# Patient Record
Sex: FEMALE | Race: WHITE | NOT HISPANIC OR LATINO
[De-identification: names, ages, dates, MRNs, and addresses within clinical notes are randomized per-mention and may not be internally consistent; named-entity substitution may affect disease eponyms.]

---

## 2019-07-10 PROBLEM — Z00.00 ENCOUNTER FOR PREVENTIVE HEALTH EXAMINATION: Status: ACTIVE | Noted: 2019-07-10

## 2019-08-09 ENCOUNTER — APPOINTMENT (OUTPATIENT)
Dept: BARIATRICS | Facility: CLINIC | Age: 59
End: 2019-08-09

## 2019-09-20 ENCOUNTER — APPOINTMENT (OUTPATIENT)
Dept: BARIATRICS | Facility: CLINIC | Age: 59
End: 2019-09-20

## 2019-10-31 ENCOUNTER — APPOINTMENT (OUTPATIENT)
Dept: BARIATRICS | Facility: CLINIC | Age: 59
End: 2019-10-31
Payer: COMMERCIAL

## 2019-10-31 VITALS
DIASTOLIC BLOOD PRESSURE: 83 MMHG | HEART RATE: 71 BPM | SYSTOLIC BLOOD PRESSURE: 144 MMHG | HEIGHT: 62.5 IN | BODY MASS INDEX: 36.15 KG/M2 | WEIGHT: 201.5 LBS

## 2019-10-31 DIAGNOSIS — Z86.79 PERSONAL HISTORY OF OTHER DISEASES OF THE CIRCULATORY SYSTEM: ICD-10-CM

## 2019-10-31 DIAGNOSIS — K76.0 FATTY (CHANGE OF) LIVER, NOT ELSEWHERE CLASSIFIED: ICD-10-CM

## 2019-10-31 DIAGNOSIS — Z83.3 FAMILY HISTORY OF DIABETES MELLITUS: ICD-10-CM

## 2019-10-31 DIAGNOSIS — Z86.59 PERSONAL HISTORY OF OTHER MENTAL AND BEHAVIORAL DISORDERS: ICD-10-CM

## 2019-10-31 DIAGNOSIS — Z78.9 OTHER SPECIFIED HEALTH STATUS: ICD-10-CM

## 2019-10-31 DIAGNOSIS — Z86.73 PERSONAL HISTORY OF TRANSIENT ISCHEMIC ATTACK (TIA), AND CEREBRAL INFARCTION W/OUT RESIDUAL DEFICITS: ICD-10-CM

## 2019-10-31 DIAGNOSIS — G47.33 OBSTRUCTIVE SLEEP APNEA (ADULT) (PEDIATRIC): ICD-10-CM

## 2019-10-31 PROCEDURE — 99205 OFFICE O/P NEW HI 60 MIN: CPT

## 2019-10-31 RX ORDER — CARVEDILOL 12.5 MG/1
12.5 TABLET, FILM COATED ORAL
Refills: 0 | Status: ACTIVE | COMMUNITY

## 2019-10-31 RX ORDER — IRBESARTAN 150 MG/1
150 TABLET ORAL
Refills: 0 | Status: ACTIVE | COMMUNITY

## 2019-10-31 RX ORDER — DEXAMETHASONE 1 MG/1
1 TABLET ORAL
Qty: 1 | Refills: 0 | Status: ACTIVE | COMMUNITY
Start: 2019-10-31 | End: 1900-01-01

## 2019-10-31 RX ORDER — DAPAGLIFLOZIN 10 MG/1
10 TABLET, FILM COATED ORAL
Refills: 0 | Status: ACTIVE | COMMUNITY

## 2019-10-31 RX ORDER — INSULIN DEGLUDEC INJECTION 100 U/ML
100 INJECTION, SOLUTION SUBCUTANEOUS
Refills: 0 | Status: ACTIVE | COMMUNITY

## 2019-10-31 RX ORDER — PAROXETINE HYDROCHLORIDE 30 MG/1
30 TABLET, FILM COATED ORAL
Refills: 0 | Status: ACTIVE | COMMUNITY

## 2019-10-31 RX ORDER — AMLODIPINE BESYLATE 10 MG/1
10 TABLET ORAL
Refills: 0 | Status: ACTIVE | COMMUNITY

## 2019-10-31 RX ORDER — GLIPIZIDE 10 MG/1
10 TABLET, FILM COATED, EXTENDED RELEASE ORAL
Refills: 0 | Status: ACTIVE | COMMUNITY

## 2019-10-31 RX ORDER — CLOPIDOGREL 75 MG/1
75 TABLET, FILM COATED ORAL
Refills: 0 | Status: ACTIVE | COMMUNITY

## 2019-10-31 NOTE — ASSESSMENT
[FreeTextEntry1] : Goals set today- patient will start exercising 3x a week either at the gym or using online exercise dance class\par Patient will have labs drawn including 1mg dex suppression test\par Declined to see RD- will track using [employer] pal or L[employer] it\par Possibly interested in bariatric surgery in the future- referred to [facility] website to watch videos\par Can consider medications in the future- Victoza for diabetes/Saxenda of if comes of Paxil Contrave or Belviq.  Will also need to review blood work and discuss metformin\par RTO 2 weeks

## 2019-10-31 NOTE — REASON FOR VISIT
[Initial Consultation] : an initial consultation for [Diabetes Mellitus] : diabetes mellitus [Hypertension] : hypertension [Metabolic Syndrome] : metabolic syndrome [Obesity] : obesity [Obstructive Sleep Apena] : obstructive sleep apena

## 2019-10-31 NOTE — HISTORY OF PRESENT ILLNESS
[FreeTextEntry1] : 58 year old female for medical weight loss consultation.  \par Patient has a history of type 2 diabetes for 5 years.  HbA1C 7.2% followed by Dr. Adams.  Has tried victoza in the past but did not help much with BG levels.  Complications including diabetic retinopathy.  UTD with eye, foot, and renal screening- follows with Dr. Thorne.  Farxiga 10mg/day, Tresiba 40 units/day, glipide 10mg BID\par Started to gain most of her weight 20 years ago during stressful time at work.  Many family members also struggle with their weight.  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  Likes dance aerobics in the past.\par Water intake- adequate\par Sleep patterns- BRODY on CPAP 7-8 hours/night\par Food recall- B- muffin- but usually eats greek yogurt with berries L- salad or chicken salad sandwich likes ranch dressing D- salad or protein/vegetables S- none\par Feels she is always hungry\par Motivation- better health- would like to get back down to 115-120 ideally\par

## 2019-11-15 ENCOUNTER — APPOINTMENT (OUTPATIENT)
Dept: BARIATRICS | Facility: CLINIC | Age: 59
End: 2019-11-15
Payer: COMMERCIAL

## 2019-11-15 VITALS
SYSTOLIC BLOOD PRESSURE: 158 MMHG | WEIGHT: 200 LBS | HEIGHT: 62.5 IN | BODY MASS INDEX: 35.88 KG/M2 | DIASTOLIC BLOOD PRESSURE: 78 MMHG | HEART RATE: 69 BPM

## 2019-11-15 PROCEDURE — XXXXX: CPT

## 2019-11-15 RX ORDER — ISOPROPYL ALCOHOL 70 ML/100ML
SWAB TOPICAL
Qty: 1 | Refills: 3 | Status: ACTIVE | COMMUNITY
Start: 2019-11-15 | End: 1900-01-01

## 2019-11-15 RX ORDER — PEN NEEDLE, DIABETIC 29 G X1/2"
32G X 4 MM NEEDLE, DISPOSABLE MISCELLANEOUS
Qty: 1 | Refills: 2 | Status: ACTIVE | COMMUNITY
Start: 2019-11-15 | End: 1900-01-01

## 2019-11-15 RX ORDER — LIRAGLUTIDE 6 MG/ML
18 INJECTION, SOLUTION SUBCUTANEOUS
Qty: 1 | Refills: 2 | Status: ACTIVE | COMMUNITY
Start: 2019-11-15 | End: 1900-01-01

## 2019-11-15 NOTE — CONSULT LETTER
[Dear  ___] : Dear  [unfilled], [Courtesy Letter:] : I had the pleasure of seeing your patient, [unfilled], in my office today. [Consult Closing:] : Thank you very much for allowing me to participate in the care of this patient.  If you have any questions, please do not hesitate to contact me. [Please see my note below.] : Please see my note below. [Sincerely,] : Sincerely, [DrOsorio  ___] : Dr. PAINTING [FreeTextEntry3] : Alessandra Kolb

## 2019-11-15 NOTE — HISTORY OF PRESENT ILLNESS
[FreeTextEntry1] : 58 year old female for medical weight loss consultation.  \par Patient has a history of type 2 diabetes for 5 years.  HbA1C 7.2% followed by Dr. Adams.  Has tried victoza in the past but did not help much with BG levels.  Complications including diabetic retinopathy.  UTD with eye, foot, and renal screening- follows with Dr. Thorne.  Farxiga 10mg/day, Tresiba 40 units/day, glipide 10mg BID\par Started to gain most of her weight 20 years ago during stressful time at work.  Many family members also struggle with their weight.  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  Likes dance aerobics in the past.\par Water intake- adequate\par Sleep patterns- BRODY on CPAP 7-8 hours/night\par Food recall- B- muffin- but usually eats greek yogurt with berries L- salad or chicken salad sandwich likes ranch dressing D- salad or protein/vegetables S- none\par Feels she is always hungry\par Motivation- better health- would like to get back down to 115-120 ideally\par \par \par 11/15/19- Patient RTO feeling well.  Has not lost weight but has made some lifestyle changes: going to the gym 3x a week (treadmill, elliptical, circuit training) tracking food intake with [employer] pal- records reviewed 1000-1200kcal/day.  Reviewed labs from 11/2/19- HbA1C 7.9% Tbili 1.7 (no h/o gallstone or RUQ pain)

## 2019-11-15 NOTE — REASON FOR VISIT
[Follow-Up Visit] : a follow-up visit for [Obesity] : obesity [Diabetes Mellitus] : diabetes mellitus

## 2019-11-15 NOTE — ASSESSMENT
[FreeTextEntry1] : Patient to continue tracking food intake with myfitness pal and work on endurance at the gym 3x a week\par Following low carbohydrate diet\par Will start Saxenda for obesity/DM type 2- patient has been on victoza in the past- reviewed injection technique, pen storage, needle safety, injection sites, possible side effects including nausea, GERD, headaches, dizziness, pancreatitis, medullary thyroid ca- written information provided.  Sample of Saxenda provided [id number] Exp 8/2020\par If unable to get prior authorization from insurance can also retry Trulicity as patient was on this in the past but on the low 0.75mg dosage \par Reviewed hypoglycemia s/s/tx and if event occurs patient will call me and we will dose down her glipizide or Tresiba \par RTO 4-6 weeks

## 2020-01-02 ENCOUNTER — APPOINTMENT (OUTPATIENT)
Dept: BARIATRICS | Facility: CLINIC | Age: 60
End: 2020-01-02

## 2020-02-06 ENCOUNTER — APPOINTMENT (OUTPATIENT)
Dept: BARIATRICS | Facility: CLINIC | Age: 60
End: 2020-02-06
Payer: COMMERCIAL

## 2020-02-06 VITALS
WEIGHT: 201.6 LBS | BODY MASS INDEX: 36.29 KG/M2 | SYSTOLIC BLOOD PRESSURE: 147 MMHG | DIASTOLIC BLOOD PRESSURE: 88 MMHG | HEART RATE: 68 BPM

## 2020-02-06 DIAGNOSIS — E11.29 TYPE 2 DIABETES MELLITUS WITH OTHER DIABETIC KIDNEY COMPLICATION: ICD-10-CM

## 2020-02-06 DIAGNOSIS — E66.9 OBESITY, UNSPECIFIED: ICD-10-CM

## 2020-02-06 DIAGNOSIS — E11.39 TYPE 2 DIABETES MELLITUS WITH OTHER DIABETIC OPHTHALMIC COMPLICATION: ICD-10-CM

## 2020-02-06 DIAGNOSIS — E11.65 TYPE 2 DIABETES MELLITUS WITH OTHER DIABETIC OPHTHALMIC COMPLICATION: ICD-10-CM

## 2020-02-06 DIAGNOSIS — R80.9 TYPE 2 DIABETES MELLITUS WITH OTHER DIABETIC KIDNEY COMPLICATION: ICD-10-CM

## 2020-02-06 PROCEDURE — 99214 OFFICE O/P EST MOD 30 MIN: CPT

## 2020-02-06 RX ORDER — DULAGLUTIDE 0.75 MG/.5ML
0.75 INJECTION, SOLUTION SUBCUTANEOUS
Qty: 1 | Refills: 5 | Status: ACTIVE | COMMUNITY
Start: 2020-02-06 | End: 1900-01-01

## 2020-02-06 NOTE — HISTORY OF PRESENT ILLNESS
[FreeTextEntry1] : 58 year old female for medical weight loss consultation.  \par Patient has a history of type 2 diabetes for 5 years.  HbA1C 7.2% followed by Dr. Adams.  Has tried victoza in the past but did not help much with BG levels.  Complications including diabetic retinopathy.  UTD with eye, foot, and renal screening- follows with Dr. Thorne.  Farxiga 10mg/day, Tresiba 40 units/day, glipide 10mg BID\par Started to gain most of her weight 20 years ago during stressful time at work.  Many family members also struggle with their weight.  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  Likes dance aerobics in the past.\par Water intake- adequate\par Sleep patterns- BRODY on CPAP 7-8 hours/night\par Food recall- B- muffin- but usually eats greek yogurt with berries L- salad or chicken salad sandwich likes ranch dressing D- salad or protein/vegetables S- none\par Feels she is always hungry\par Motivation- better health- would like to get back down to 115-120 ideally\par \par \par 11/15/19- Patient RTO feeling well.  Has not lost weight but has made some lifestyle changes: going to the gym 3x a week (treadmill, elliptical, circuit training) tracking food intake with [place] pal- records reviewed 1000-1200kcal/day.  Reviewed labs from 11/2/19- HbA1C 7.9% Tbili 1.7 (no h/o gallstone or RUQ pain)\par \par 2/6/20- Patient RTO feeling well.  Had been on Saxenda for 1.5 months different dosages but unable to tolerate medication due to gas, constipation, nausea.  Has been off for about a month.  Also had the flu.  +1.6 pounds since last visit.  Has not been going to the gym consistently.  Traveled to [place] to visit her mother.  1400-1600kcal/day.  Eating 3 meals/day, 1 chocolate square snack midafternoon at work.  No snacking at night.  No binge eating behavior.  's, mid afternoon 120's- no hypoglycemia on Saxenda.  Reports recent labs with Dr. Thorne- improved HbA1C, decreased proteinuria

## 2020-02-06 NOTE — REASON FOR VISIT
[Follow-Up Visit] : a follow-up visit for [Diabetes Mellitus] : diabetes mellitus [Hypertension] : hypertension [Metabolic Syndrome] : metabolic syndrome [Obesity] : obesity

## 2020-02-06 NOTE — ASSESSMENT
[FreeTextEntry1] : Discussed importance of reestablishing exercise regimen 3x a week\par Declined RD but will start mail order  (not sure name) low carb for people with diabetes\par Unable to tolerate metformin in the past- will avoid liraglatide but would benefit from a GLP-1RA.  Will retry Trulicity.  She's been on it in the past without negative side effects but without perceived results.  If she notices side effects, she will stop and let me know.  \par She is establishing care with new endocrinologist Dr. Webb next week- asked that she send me any lab results.  Also, she is not currently on a statin with a h/o CAD/DMT2- will address at next visit if Dr. Webb does not start her on one  \par RTO 1 month

## 2020-03-20 ENCOUNTER — APPOINTMENT (OUTPATIENT)
Dept: BARIATRICS | Facility: CLINIC | Age: 60
End: 2020-03-20

## 2024-10-29 ENCOUNTER — RESULT REVIEW (OUTPATIENT)
Age: 64
End: 2024-10-29

## 2024-10-30 ENCOUNTER — NON-APPOINTMENT (OUTPATIENT)
Age: 64
End: 2024-10-30

## 2024-11-01 ENCOUNTER — NON-APPOINTMENT (OUTPATIENT)
Age: 64
End: 2024-11-01

## 2024-11-05 ENCOUNTER — TRANSCRIPTION ENCOUNTER (OUTPATIENT)
Age: 64
End: 2024-11-05

## 2024-11-15 ENCOUNTER — TRANSCRIPTION ENCOUNTER (OUTPATIENT)
Age: 64
End: 2024-11-15

## 2024-11-19 ENCOUNTER — APPOINTMENT (OUTPATIENT)
Dept: NEPHROLOGY | Facility: CLINIC | Age: 64
End: 2024-11-19
Payer: SELF-PAY

## 2024-11-19 VITALS
BODY MASS INDEX: 32.3 KG/M2 | HEIGHT: 62.5 IN | OXYGEN SATURATION: 98 % | WEIGHT: 180 LBS | HEART RATE: 78 BPM | DIASTOLIC BLOOD PRESSURE: 90 MMHG | SYSTOLIC BLOOD PRESSURE: 130 MMHG

## 2024-11-19 DIAGNOSIS — Z86.79 PERSONAL HISTORY OF OTHER DISEASES OF THE CIRCULATORY SYSTEM: ICD-10-CM

## 2024-11-19 DIAGNOSIS — N18.9 CHRONIC KIDNEY DISEASE, UNSPECIFIED: ICD-10-CM

## 2024-11-19 DIAGNOSIS — I10 ESSENTIAL (PRIMARY) HYPERTENSION: ICD-10-CM

## 2024-11-19 PROCEDURE — 99214 OFFICE O/P EST MOD 30 MIN: CPT

## 2024-11-19 RX ORDER — HYDRALAZINE HYDROCHLORIDE 100 MG/1
100 TABLET ORAL 3 TIMES DAILY
Qty: 270 | Refills: 1 | Status: ACTIVE | COMMUNITY
Start: 2024-11-19 | End: 1900-01-01

## 2024-11-19 RX ORDER — HYDROCHLOROTHIAZIDE 25 MG/1
25 TABLET ORAL DAILY
Qty: 90 | Refills: 1 | Status: ACTIVE | COMMUNITY
Start: 2024-11-19 | End: 1900-01-01

## 2024-11-21 LAB
25(OH)D3 SERPL-MCNC: 19.1 NG/ML
ANION GAP SERPL CALC-SCNC: 11 MMOL/L
APPEARANCE: CLEAR
BACTERIA: ABNORMAL /HPF
BILIRUBIN URINE: NEGATIVE
BLOOD URINE: NEGATIVE
BUN SERPL-MCNC: 42 MG/DL
CALCIUM SERPL-MCNC: 9.4 MG/DL
CALCIUM SERPL-MCNC: 9.4 MG/DL
CAST: 2 /LPF
CHLORIDE SERPL-SCNC: 100 MMOL/L
CO2 SERPL-SCNC: 29 MMOL/L
COLOR: YELLOW
CREAT SERPL-MCNC: 1.77 MG/DL
CREAT SPEC-SCNC: 74 MG/DL
CREAT/PROT UR: 2.5 RATIO
CYSTATIN C SERPL-MCNC: 1.64 MG/L
EGFR: 32 ML/MIN/1.73M2
EPITHELIAL CELLS: 24 /HPF
GFR/BSA.PRED SERPLBLD CYS-BASED-ARV: 37 ML/MIN/1.73M2
GLUCOSE QUALITATIVE U: 500 MG/DL
GLUCOSE SERPL-MCNC: 294 MG/DL
HCT VFR BLD CALC: 37.2 %
HGB BLD-MCNC: 12 G/DL
KETONES URINE: NEGATIVE MG/DL
LEUKOCYTE ESTERASE URINE: NEGATIVE
MCHC RBC-ENTMCNC: 29.7 PG
MCHC RBC-ENTMCNC: 32.3 G/DL
MCV RBC AUTO: 92.1 FL
MICROSCOPIC-UA: NORMAL
NITRITE URINE: NEGATIVE
PARATHYROID HORMONE INTACT: 78 PG/ML
PH URINE: 5.5
PHOSPHATE SERPL-MCNC: 3.5 MG/DL
PLATELET # BLD AUTO: 250 K/UL
POTASSIUM SERPL-SCNC: 4.2 MMOL/L
PROT UR-MCNC: 181 MG/DL
PROTEIN URINE: 300 MG/DL
RBC # BLD: 4.04 M/UL
RBC # FLD: 12.9 %
RED BLOOD CELLS URINE: 0 /HPF
REVIEW: NORMAL
SODIUM ?TM SUB UR QN: 75 MMOL/L
SODIUM SERPL-SCNC: 140 MMOL/L
SPECIFIC GRAVITY URINE: 1.02
UROBILINOGEN URINE: 0.2 MG/DL
WBC # FLD AUTO: 7.11 K/UL
WHITE BLOOD CELLS URINE: 24 /HPF